# Patient Record
Sex: FEMALE | Race: BLACK OR AFRICAN AMERICAN
[De-identification: names, ages, dates, MRNs, and addresses within clinical notes are randomized per-mention and may not be internally consistent; named-entity substitution may affect disease eponyms.]

---

## 2018-10-07 ENCOUNTER — HOSPITAL ENCOUNTER (EMERGENCY)
Dept: HOSPITAL 61 - ER | Age: 68
Discharge: HOME | End: 2018-10-07
Payer: COMMERCIAL

## 2018-10-07 VITALS — WEIGHT: 237 LBS | BODY MASS INDEX: 38.09 KG/M2 | HEIGHT: 66 IN

## 2018-10-07 VITALS — DIASTOLIC BLOOD PRESSURE: 70 MMHG | SYSTOLIC BLOOD PRESSURE: 148 MMHG

## 2018-10-07 DIAGNOSIS — E78.00: ICD-10-CM

## 2018-10-07 DIAGNOSIS — I10: ICD-10-CM

## 2018-10-07 DIAGNOSIS — E03.9: ICD-10-CM

## 2018-10-07 DIAGNOSIS — Z88.5: ICD-10-CM

## 2018-10-07 DIAGNOSIS — K21.9: ICD-10-CM

## 2018-10-07 DIAGNOSIS — R10.13: Primary | ICD-10-CM

## 2018-10-07 DIAGNOSIS — R10.12: ICD-10-CM

## 2018-10-07 LAB
ALBUMIN SERPL-MCNC: 4 G/DL (ref 3.4–5)
ALBUMIN/GLOB SERPL: 0.8 {RATIO} (ref 1–1.7)
ALP SERPL-CCNC: 59 U/L (ref 46–116)
ALT SERPL-CCNC: 31 U/L (ref 14–59)
AMPHETAMINE/METHAMPHETAMINE: (no result)
ANION GAP SERPL CALC-SCNC: 9 MMOL/L (ref 6–14)
APTT PPP: YELLOW S
AST SERPL-CCNC: 31 U/L (ref 15–37)
BACTERIA #/AREA URNS HPF: 0 /HPF
BARBITURATES UR-MCNC: (no result) UG/ML
BASOPHILS # BLD AUTO: 0.1 X10^3/UL (ref 0–0.2)
BASOPHILS NFR BLD: 1 % (ref 0–3)
BENZODIAZ UR-MCNC: (no result) UG/L
BILIRUB SERPL-MCNC: 0.5 MG/DL (ref 0.2–1)
BILIRUB UR QL STRIP: NEGATIVE
BUN SERPL-MCNC: 8 MG/DL (ref 7–20)
BUN/CREAT SERPL: 10 (ref 6–20)
CALCIUM SERPL-MCNC: 10.3 MG/DL (ref 8.5–10.1)
CANNABINOIDS UR-MCNC: (no result) UG/L
CHLORIDE SERPL-SCNC: 97 MMOL/L (ref 98–107)
CO2 SERPL-SCNC: 30 MMOL/L (ref 21–32)
COCAINE UR-MCNC: (no result) NG/ML
CREAT SERPL-MCNC: 0.8 MG/DL (ref 0.6–1)
EOSINOPHIL NFR BLD: 0.1 X10^3/UL (ref 0–0.7)
EOSINOPHIL NFR BLD: 1 % (ref 0–3)
ERYTHROCYTE [DISTWIDTH] IN BLOOD BY AUTOMATED COUNT: 15.6 % (ref 11.5–14.5)
FIBRINOGEN PPP-MCNC: CLEAR MG/DL
GFR SERPLBLD BASED ON 1.73 SQ M-ARVRAT: 86.8 ML/MIN
GLOBULIN SER-MCNC: 5 G/DL (ref 2.2–3.8)
GLUCOSE SERPL-MCNC: 108 MG/DL (ref 70–99)
HCT VFR BLD CALC: 39.1 % (ref 36–47)
HGB BLD-MCNC: 13.5 G/DL (ref 12–15.5)
LIPASE: 111 U/L (ref 73–393)
LYMPHOCYTES # BLD: 1.8 X10^3/UL (ref 1–4.8)
LYMPHOCYTES NFR BLD AUTO: 21 % (ref 24–48)
MCH RBC QN AUTO: 31 PG (ref 25–35)
MCHC RBC AUTO-ENTMCNC: 35 G/DL (ref 31–37)
MCV RBC AUTO: 88 FL (ref 79–100)
METHADONE SERPL-MCNC: (no result) NG/ML
MONO #: 0.5 X10^3/UL (ref 0–1.1)
MONOCYTES NFR BLD: 6 % (ref 0–9)
NEUT #: 5.8 X10^3UL (ref 1.8–7.7)
NEUTROPHILS NFR BLD AUTO: 70 % (ref 31–73)
NITRITE UR QL STRIP: NEGATIVE
OPIATES UR-MCNC: (no result) NG/ML
PCP SERPL-MCNC: (no result) MG/DL
PH UR STRIP: 7.5 [PH]
PLATELET # BLD AUTO: 366 X10^3/UL (ref 140–400)
POTASSIUM SERPL-SCNC: 4.1 MMOL/L (ref 3.5–5.1)
PROT SERPL-MCNC: 9 G/DL (ref 6.4–8.2)
PROT UR STRIP-MCNC: NEGATIVE MG/DL
RBC # BLD AUTO: 4.42 X10^6/UL (ref 3.5–5.4)
RBC #/AREA URNS HPF: 0 /HPF (ref 0–2)
SODIUM SERPL-SCNC: 136 MMOL/L (ref 136–145)
SQUAMOUS #/AREA URNS LPF: (no result) /LPF
UROBILINOGEN UR-MCNC: 0.2 MG/DL
WBC # BLD AUTO: 8.2 X10^3/UL (ref 4–11)
WBC #/AREA URNS HPF: (no result) /HPF (ref 0–4)

## 2018-10-07 PROCEDURE — 36415 COLL VENOUS BLD VENIPUNCTURE: CPT

## 2018-10-07 PROCEDURE — 80307 DRUG TEST PRSMV CHEM ANLYZR: CPT

## 2018-10-07 PROCEDURE — G0479 DRUG TEST PRESUMP NOT OPT: HCPCS

## 2018-10-07 PROCEDURE — 74177 CT ABD & PELVIS W/CONTRAST: CPT

## 2018-10-07 PROCEDURE — 83690 ASSAY OF LIPASE: CPT

## 2018-10-07 PROCEDURE — 83880 ASSAY OF NATRIURETIC PEPTIDE: CPT

## 2018-10-07 PROCEDURE — 85025 COMPLETE CBC W/AUTO DIFF WBC: CPT

## 2018-10-07 PROCEDURE — C9113 INJ PANTOPRAZOLE SODIUM, VIA: HCPCS

## 2018-10-07 PROCEDURE — 81001 URINALYSIS AUTO W/SCOPE: CPT

## 2018-10-07 PROCEDURE — 80053 COMPREHEN METABOLIC PANEL: CPT

## 2018-10-07 PROCEDURE — 84484 ASSAY OF TROPONIN QUANT: CPT

## 2018-10-07 PROCEDURE — 93005 ELECTROCARDIOGRAM TRACING: CPT

## 2018-10-07 PROCEDURE — 99285 EMERGENCY DEPT VISIT HI MDM: CPT

## 2018-10-07 PROCEDURE — 96374 THER/PROPH/DIAG INJ IV PUSH: CPT

## 2018-10-07 NOTE — PHYS DOC
Past Medical History


Past Medical History:  GERD, High Cholesterol, Hypertension, Hypothyroid, 

Pneumonia, Other


Additional Past Medical Histor:  HEART MURMER


Past Surgical History:  Other


Additional Past Surgical Histo:  LEFT FOOT


Alcohol Use:  None


Drug Use:  None





Adult General


Chief Complaint


Chief Complaint:  ABDOMINAL PAIN





[facility]


HPI





Patient is a 68  year old female with history of acid reflex, hypertension, 

high cholesterol, who presents today with a mild intermittent epigastric 

abdominal pain radiating to the left upper quadrant that began 6 days ago. 

Patient denies anything exacerbating or making the pain better. She states she 

was seen by the PCP 2 days ago and was started on Prilosec. She states she has 

not attained much relief from it. Patient denies any nausea vomiting. Denies 

any chest pain or shortness of breath.





Review of Systems


Review of Systems





Constitutional: Denies fever or chills []


Eyes: Denies change in visual acuity, redness, or eye pain []


HENT: Denies nasal congestion or sore throat []


Respiratory: Denies cough or shortness of breath []


Cardiovascular: No additional information not addressed in HPI []


GI: Reports left upper quadrant abdominal pain, epigastric pain, denies nausea, 

vomiting, bloody stools or diarrhea []


: Denies dysuria or hematuria []


Musculoskeletal: Denies back pain or joint pain []


Integument: Denies rash or skin lesions []


Neurologic: Denies headache, focal weakness or sensory changes []





All other systems were reviewed and found to be within normal limits, except as 

documented in this note.





Current Medications


Current Medications





Current Medications








 Medications


  (Trade)  Dose


 Ordered  Sig/Tanvi  Start Time


 Stop Time Status Last Admin


Dose Admin


 


 Info


  (CONTRAST GIVEN


 -- Rx MONITORING)  1 each  PRN DAILY  PRN  10/7/18 10:15


 10/9/18 10:14   





 


 Iohexol


  (Omnipaque 300


 Mg/ml)  75 ml  1X  ONCE  10/7/18 10:15


 10/7/18 10:16 DC 10/7/18 10:15


75 ML


 


 Multi-Ingredient


 Mouthwash/Gargle


  (Gi Cocktail)  20 ml  1X  ONCE  10/7/18 09:45


 10/7/18 09:46 DC 10/7/18 10:10


20 ML


 


 Pantoprazole


 Sodium


  (PROTONIX VIAL


 for IV PUSH)  40 mg  1X  ONCE  10/7/18 09:45


 10/7/18 09:46 DC 10/7/18 10:11


40 MG


 


 Sodium Chloride  1,000 ml @ 


 1,000 mls/hr  1X  ONCE  10/7/18 09:45


 10/7/18 10:44 DC 10/7/18 10:04


1,000 MLS/HR











Allergies


Allergies





Allergies








Coded Allergies Type Severity Reaction Last Updated Verified


 


  codeine Allergy Intermediate  8/1/14 Yes











Physical Exam


Physical Exam





Constitutional: Well developed, well nourished, no acute distress, non-toxic 

appearance. []


HENT: Normocephalic, atraumatic, bilateral external ears normal, oropharynx 

moist, no oral exudates, nose normal. []


Eyes: PERRLA, EOMI, conjunctiva normal, no discharge. [] 


Neck: Normal range of motion, no tenderness, supple, no stridor. [] 


Cardiovascular:Heart rate regular rhythm, no murmur []


Lungs & Thorax:  Bilateral breath sounds clear to auscultation []


Abdomen: Bowel sounds normal, soft, no tenderness, no masses, no pulsatile 

masses. [] 


Skin: Warm, dry, no erythema, no rash. [] 


Back: No tenderness, no CVA tenderness. [] 


Extremities: No tenderness, no cyanosis, no clubbing, ROM intact, no edema. [] 


Neurologic: Alert and oriented X 3, normal motor function, normal sensory 

function, no focal deficits noted. []


Psychologic: Affect normal, judgement normal, mood normal. []





Current Patient Data


Vital Signs





 Vital Signs








  Date Time  Temp Pulse Resp B/P (MAP) Pulse Ox O2 Delivery O2 Flow Rate FiO2


 


10/7/18 09:15 98.1 92 22 151/75 (100) 97 Room Air  





 98.1       








Lab Values





 Laboratory Tests








Test


 10/7/18


09:24 10/7/18


09:54


 


Urine Collection Type Unknown   


 


Urine Color Yellow   


 


Urine Clarity Clear   


 


Urine pH 7.5   


 


Urine Specific Gravity 1.010   


 


Urine Protein


 Negative mg/dL


(NEG-TRACE) 





 


Urine Glucose (UA)


 Negative mg/dL


(NEG) 





 


Urine Ketones (Stick)


 Negative mg/dL


(NEG) 





 


Urine Blood


 Negative (NEG)


 





 


Urine Nitrite


 Negative (NEG)


 





 


Urine Bilirubin


 Negative (NEG)


 





 


Urine Urobilinogen Dipstick


 0.2 mg/dL (0.2


mg/dL) 





 


Urine Leukocyte Esterase


 Negative (NEG)


 





 


Urine RBC 0 /HPF (0-2)   


 


Urine WBC


 1-4 /HPF (0-4)


 





 


Urine Squamous Epithelial


Cells Few /LPF  


 





 


Urine Bacteria


 0 /HPF (0-FEW)


 





 


Urine Opiates Screen Neg (NEG)   


 


Urine Methadone Screen Neg (NEG)   


 


Urine Barbiturates Neg (NEG)   


 


Urine Phencyclidine Screen Neg (NEG)   


 


Urine


Amphetamine/Methamphetamine Neg (NEG)  


 





 


Urine Benzodiazepines Screen Neg (NEG)   


 


Urine Cocaine Screen Neg (NEG)   


 


Urine Cannabinoids Screen Neg (NEG)   


 


Urine Ethyl Alcohol Neg (NEG)   


 


White Blood Count


 


 8.2 x10^3/uL


(4.0-11.0)


 


Red Blood Count


 


 4.42 x10^6/uL


(3.50-5.40)


 


Hemoglobin


 


 13.5 g/dL


(12.0-15.5)


 


Hematocrit


 


 39.1 %


(36.0-47.0)


 


Mean Corpuscular Volume


 


 88 fL ()





 


Mean Corpuscular Hemoglobin  31 pg (25-35)  


 


Mean Corpuscular Hemoglobin


Concent 


 35 g/dL


(31-37)


 


Red Cell Distribution Width


 


 15.6 %


(11.5-14.5)  H


 


Platelet Count


 


 366 x10^3/uL


(140-400)


 


Neutrophils (%) (Auto)  70 % (31-73)  


 


Lymphocytes (%) (Auto)  21 % (24-48)  L


 


Monocytes (%) (Auto)  6 % (0-9)  


 


Eosinophils (%) (Auto)  1 % (0-3)  


 


Basophils (%) (Auto)  1 % (0-3)  


 


Neutrophils # (Auto)


 


 5.8 x10^3uL


(1.8-7.7)


 


Lymphocytes # (Auto)


 


 1.8 x10^3/uL


(1.0-4.8)


 


Monocytes # (Auto)


 


 0.5 x10^3/uL


(0.0-1.1)


 


Eosinophils # (Auto)


 


 0.1 x10^3/uL


(0.0-0.7)


 


Basophils # (Auto)


 


 0.1 x10^3/uL


(0.0-0.2)


 


Sodium Level


 


 136 mmol/L


(136-145)


 


Potassium Level


 


 4.1 mmol/L


(3.5-5.1)


 


Chloride Level


 


 97 mmol/L


()  L


 


Carbon Dioxide Level


 


 30 mmol/L


(21-32)


 


Anion Gap  9 (6-14)  


 


Blood Urea Nitrogen


 


 8 mg/dL (7-20)





 


Creatinine


 


 0.8 mg/dL


(0.6-1.0)


 


Estimated GFR


(Cockcroft-Gault) 


 86.8  





 


BUN/Creatinine Ratio  10 (6-20)  


 


Glucose Level


 


 108 mg/dL


(70-99)  H


 


Calcium Level


 


 10.3 mg/dL


(8.5-10.1)  H


 


Total Bilirubin


 


 0.5 mg/dL


(0.2-1.0)


 


Aspartate Amino Transferase


(AST) 


 31 U/L (15-37)





 


Alanine Aminotransferase (ALT)


 


 31 U/L (14-59)





 


Alkaline Phosphatase


 


 59 U/L


()


 


Troponin I Quantitative


 


 < 0.017 ng/mL


(0.000-0.055)


 


NT-Pro-B-Type Natriuretic


Peptide 


 25 pg/mL


(0-124)


 


Total Protein


 


 9.0 g/dL


(6.4-8.2)  H


 


Albumin


 


 4.0 g/dL


(3.4-5.0)


 


Albumin/Globulin Ratio


 


 0.8 (1.0-1.7)


L


 


Lipase


 


 111 U/L


()


 


Ethyl Alcohol Level


 


 < 10 mg/dL


(0-10)





 Laboratory Tests


10/7/18 09:54








 Laboratory Tests


10/7/18 09:54











EKG


EKG


09:40 Interpreted by Dr. Fonseca sinus rhythm Hr 69 no STEMI[]





Radiology/Procedures


Radiology/Procedures


[]PROCEDURE: CT ABD PELV W/ IV CONTRST ONLY





EXAM: CT ABDOMEN/PELVIS WITH CONTRAST.


 


HISTORY: Epigastric and left upper quadrant pain.


 


TECHNIQUE: Computed tomography of the abdomen and pelvis was performed 


after the intravenous administration of 75 mL Omnipaque 300.


 


COMPARISON: 4-16.


 


FINDINGS: Lung windows through the visualized portions of the bases reveal


mild atelectasis. Bone windows reveal no suspicious lesions.


 


A cyst in the left kidney measures 3.7 cm and appears benign. The right 


kidney is unremarkable. The liver, gallbladder, pancreas, adrenal glands 


and spleen are unremarkable. There is a small hiatal hernia. There are no 


pathologically enlarged lymph nodes.


 


The appendix is not inflamed. There is no small bowel obstruction. The 


uterus and ovaries are unremarkable by CT. A small umbilical hernia 


contains only fat.


 


IMPRESSION: 


1. No cause for acute pain is identified.


2. Small umbilical hernia containing only fat.


3. Small hiatal hernia.


 


 


*One or more of the following individualized dose reduction techniques 


were utilized for this examination:  


1. Automated exposure control.  


2. Adjustment of the mA and/or kV according to patient size.  


3. Use of iterative reconstruction technique.


 


Electronically signed by: NILAY aDwkins MD (10/7/2018 11:10 AM) 


[facility]














DICTATED and SIGNED BY:     DARIEL DAWKINS MD


DATE:     10/07/18 1051





Course & Med Decision Making


Course & Med Decision Making


Pertinent Labs and Imaging studies reviewed. (See chart for details)





This is a 68-year-old female patient presenting to the ED today with mild 

epigastric abdominal pain radiating to the left upper quadrant. Patient's labs 

are negative for any acute findings, CT of the abdomen and pelvic is also 

negative. Switched her to Nexium and Carafate. Follow-up with GI specialist 

which I provided. Discharged in stable condition.





Dragon Disclaimer


Dragon Disclaimer


This electronic medical record was generated, in whole or in part, using a 

voice recognition dictation system.





Departure


Departure


Impression:  


 Primary Impression:  


 Epigastric pain


 Additional Impression:  


 Left upper quadrant pain


Disposition:  01 HOME, SELF-CARE


Condition:  STABLE


Referrals:  


RAVINDER CELAYA MD (PCP)








MARBIN CONNOLLY MD


Follow up in one week


Patient Instructions:  Abdominal Pain





Additional Instructions:  


You were evaluated in the emergency room for abdominal pain. We wrote a couple 

prescription medicines. Take them as prescribed. Follow-up with the GI 

specialist provided in one week. Come back to the ED at any point symptoms 

worsen.


Scripts


Sucralfate (CARAFATE) 1 Gm Tablet


1 TAB PO QID, #120 TAB 1 Refill


   Prov: ZIA DENNEY         10/7/18 


Esomeprazole Magnesium (NEXIUM CAPSULE) 40 Mg Capsule.


1 CAP PO DAILY, #30 CAP 0 Refills


   Prov: ZIA DENNEY         10/7/18





Problem Qualifiers











ZIA DENNEY Oct 7, 2018 09:40

## 2018-10-07 NOTE — EKG
[facility]

              [address] [place] [address]

Test Date:    2018-10-07               Test Time:    09:40:07

Pat Name:     RADHA CONTEH             Department:   

Patient ID:   [id number]           Room:          

Gender:       F                        Technician:   

:          1950               Requested By: ZIA DENNEY

Order Number: [id number]           Reading MD:   Jae Simpson MD

                                 Measurements

Intervals                              Axis          

Rate:         69                       P:            148

CA:           154                      QRS:          -156

QRSD:         82                       T:            -175

QT:           376                                    

QTc:          404                                    

                           Interpretive Statements

SINUS RHYTHM

LIMB LEAD MISPLACEMENT

Electronically Signed On 10- 12:13:16 CDT by Jae Simpson MD

## 2018-10-07 NOTE — RAD
EXAM: CT ABDOMEN/PELVIS WITH CONTRAST.

 

HISTORY: Epigastric and left upper quadrant pain.

 

TECHNIQUE: Computed tomography of the abdomen and pelvis was performed 

after the intravenous administration of 75 mL Omnipaque 300.

 

COMPARISON: 4-16.

 

FINDINGS: Lung windows through the visualized portions of the bases reveal

mild atelectasis. Bone windows reveal no suspicious lesions.

 

A cyst in the left kidney measures 3.7 cm and appears benign. The right 

kidney is unremarkable. The liver, gallbladder, pancreas, adrenal glands 

and spleen are unremarkable. There is a small hiatal hernia. There are no 

pathologically enlarged lymph nodes.

 

The appendix is not inflamed. There is no small bowel obstruction. The 

uterus and ovaries are unremarkable by CT. A small umbilical hernia 

contains only fat.

 

IMPRESSION: 

1. No cause for acute pain is identified.

2. Small umbilical hernia containing only fat.

3. Small hiatal hernia.

 

 

*One or more of the following individualized dose reduction techniques 

were utilized for this examination:  

1. Automated exposure control.  

2. Adjustment of the mA and/or kV according to patient size.  

3. Use of iterative reconstruction technique.

 

Electronically signed by: NILAY Dawkins MD (10/7/2018 11:10 AM) 

[facility]

## 2018-10-24 ENCOUNTER — HOSPITAL ENCOUNTER (OUTPATIENT)
Dept: HOSPITAL 61 - ENDOS | Age: 68
Discharge: HOME | End: 2018-10-24
Attending: INTERNAL MEDICINE
Payer: COMMERCIAL

## 2018-10-24 VITALS — SYSTOLIC BLOOD PRESSURE: 131 MMHG | DIASTOLIC BLOOD PRESSURE: 61 MMHG

## 2018-10-24 DIAGNOSIS — E03.9: ICD-10-CM

## 2018-10-24 DIAGNOSIS — Z72.89: ICD-10-CM

## 2018-10-24 DIAGNOSIS — F41.9: ICD-10-CM

## 2018-10-24 DIAGNOSIS — Z79.82: ICD-10-CM

## 2018-10-24 DIAGNOSIS — Z88.5: ICD-10-CM

## 2018-10-24 DIAGNOSIS — Z98.51: ICD-10-CM

## 2018-10-24 DIAGNOSIS — E78.00: ICD-10-CM

## 2018-10-24 DIAGNOSIS — I10: ICD-10-CM

## 2018-10-24 DIAGNOSIS — K29.50: Primary | ICD-10-CM

## 2018-10-24 DIAGNOSIS — Z98.890: ICD-10-CM

## 2018-10-24 DIAGNOSIS — Z79.899: ICD-10-CM

## 2018-10-24 DIAGNOSIS — M19.90: ICD-10-CM

## 2018-10-24 DIAGNOSIS — K21.9: ICD-10-CM

## 2018-10-24 DIAGNOSIS — Z82.49: ICD-10-CM

## 2018-10-24 PROCEDURE — 43235 EGD DIAGNOSTIC BRUSH WASH: CPT

## 2019-11-08 ENCOUNTER — HOSPITAL ENCOUNTER (OUTPATIENT)
Dept: HOSPITAL 61 - SURG | Age: 69
End: 2019-11-08
Attending: INTERNAL MEDICINE
Payer: COMMERCIAL

## 2019-11-08 VITALS
SYSTOLIC BLOOD PRESSURE: 144 MMHG | DIASTOLIC BLOOD PRESSURE: 68 MMHG | SYSTOLIC BLOOD PRESSURE: 144 MMHG | DIASTOLIC BLOOD PRESSURE: 68 MMHG

## 2019-11-08 DIAGNOSIS — Z86.010: ICD-10-CM

## 2019-11-08 DIAGNOSIS — D12.3: ICD-10-CM

## 2019-11-08 DIAGNOSIS — E78.5: ICD-10-CM

## 2019-11-08 DIAGNOSIS — Z98.51: ICD-10-CM

## 2019-11-08 DIAGNOSIS — Z98.890: ICD-10-CM

## 2019-11-08 DIAGNOSIS — E66.9: ICD-10-CM

## 2019-11-08 DIAGNOSIS — I10: ICD-10-CM

## 2019-11-08 DIAGNOSIS — Z12.11: Primary | ICD-10-CM

## 2019-11-08 DIAGNOSIS — Z80.0: ICD-10-CM

## 2019-11-08 DIAGNOSIS — E78.00: ICD-10-CM

## 2019-11-08 DIAGNOSIS — E03.9: ICD-10-CM

## 2019-11-08 DIAGNOSIS — K57.30: ICD-10-CM

## 2019-11-08 DIAGNOSIS — Z88.5: ICD-10-CM

## 2019-11-08 DIAGNOSIS — Z87.39: ICD-10-CM

## 2019-11-08 DIAGNOSIS — K64.0: ICD-10-CM

## 2019-11-08 DIAGNOSIS — K21.9: ICD-10-CM

## 2019-11-08 PROCEDURE — 45380 COLONOSCOPY AND BIOPSY: CPT

## 2019-11-08 PROCEDURE — 88305 TISSUE EXAM BY PATHOLOGIST: CPT

## 2019-11-08 PROCEDURE — 45385 COLONOSCOPY W/LESION REMOVAL: CPT

## 2019-11-08 NOTE — HP
ADMIT DATE:  2019



REASON FOR CONSULTATION:  History of colonic polyps and surveillance.



HISTORY OF PRESENT ILLNESS:  A 69-year-old -American female with past

medical history significant for hypertension, gastroesophageal reflux disease,

hypothyroidism, hyperlipidemia, seen for interval colonoscopy.  Last exam was

approximately 5 years ago, at which time polyps were encountered.  There has

been no change in bowel habits, diarrhea or constipation.  Family history is

positive for colon cancer with her brother who is .  Weight and appetite

are stable.  She is otherwise without additional complaints.



PAST MEDICAL HISTORY:  Hypertension, hyperlipidemia, hypothyroidism,

osteoarthrosis.



ALLERGIES:  CODEINE.



MEDICATIONS:  Include amlodipine, aspirin, ____, Nexium, Allegra, Flonase,

Synthroid, omega-3 fatty acids, Crestor, Carafate,

triamterene/hydrochlorothiazide.



PAST SURGICAL HISTORY:  AAA repair, lung surgery, lysis of adhesions, tubal

ligation, knee repair.



REVIEW OF SYSTEMS:  Per records.



PHYSICAL EXAMINATION:

GENERAL:  Reveals a well-nourished, well-developed -American female who

is alert, cooperative, in no acute distress.

VITAL SIGNS:  Temperature is 97.5, pulse 85, respiratory rate is 20.

HEENT:  Normocephalic, atraumatic head.  Pupils and extraocular muscles are not

tested.  Sclerae anicteric.

NECK:  Supple.

LUNGS:  Clear.

CARDIOVASCULAR:  Reveals an S1, S2 without S3, S4 or appreciable murmur.

ABDOMEN:  Reveals soft abdomen, normal bowel sounds, without appreciable

hepatosplenomegaly with multiple surgical incisions.

EXTREMITIES:  No cyanosis, clubbing or edema.



IMPRESSION:  History of colonic polyps, family history of colon cancer, it is

warranted at this time, risks and benefits of procedure including risk of

hemorrhage and perforation during the operation have been discussed.  The

patient is willing to proceed.

 



______________________________

MARBIN CONNOLLY MD



DR:  SSP/zee  JOB#:  [id number] / [id number]

DD:  2019 10:04  DT:  2019 10:36

## 2019-11-11 NOTE — PATHOLOGY
[facility] Accession Number: [id number]

.                                                                01

Material submitted:                                        .

colon - TRANSVERSE COLON POLYP. Modifiers: transverse

.                                                                01

Clinical history:                                          .

Polyps

.                                                                02

**********************************************************************

Diagnosis:

Colon biopsy, transverse colon polyp:

- Tubular adenoma.

.

(JP:mml; 11/11/2019)

[facility]  11/11/2019  0938 Local

**********************************************************************

.                                                                02

Comment:

There is no high grade dysplasia or evidence of malignancy.

.

([facility]:mml; 11/11/2019)

.                                                                02

Electronically signed:                                     .

Oracio Mchugh MD, Pathologist

NPI- [id number]

.                                                                01

Gross description:                                         .

Received in formalin labeled "Cynthia Hooks, transverse colon polyp," is a

0.5 x 0.4 x 0.4 cm polypoid piece of tan soft tissue.  The margin is inked

and the tissue is sectioned perpendicular to the margin and submitted

entirely in cassette A1.

(TSD; 11/8/2019)

TOB/TOB  11/08/2019 2054 Local

.                                                                02

Pathologist provided ICD-10:

D12.3

.                                                                02

CPT                                                        .

213687

Specimen Comment: A courtesy copy of this report has been sent to [phone], [phone]

Specimen Comment: [id number]

Specimen Comment: Report sent to  / DR PUENTES

***Performed at:  01

   [facility]

   [address], [place]  [id number]

   MD Enrico Keller MD Phone:  [phone]

***Performed at:  02

   [facility]

   [address], [place]  [id number]

   MD Oracio Mchugh MD Phone:  [phone]

## 2021-05-27 ENCOUNTER — HOSPITAL ENCOUNTER (OUTPATIENT)
Dept: HOSPITAL 63 - LAB | Age: 71
End: 2021-05-27
Payer: COMMERCIAL

## 2021-05-27 DIAGNOSIS — D75.89: Primary | ICD-10-CM

## 2021-05-27 LAB
BASOPHILS # BLD AUTO: 0 X10^3/UL (ref 0–0.2)
BASOPHILS NFR BLD: 0 % (ref 0–3)
EOSINOPHIL NFR BLD: 0.2 X10^3/UL (ref 0–0.7)
EOSINOPHIL NFR BLD: 2 % (ref 0–3)
ERYTHROCYTE [DISTWIDTH] IN BLOOD BY AUTOMATED COUNT: 15 % (ref 11.5–14.5)
HCT VFR BLD CALC: 37.6 % (ref 36–47)
HGB BLD-MCNC: 12.4 G/DL (ref 12–15.5)
LYMPHOCYTES # BLD: 2.3 X10^3/UL (ref 1–4.8)
LYMPHOCYTES NFR BLD AUTO: 23 % (ref 24–48)
MCH RBC QN AUTO: 29 PG (ref 25–35)
MCHC RBC AUTO-ENTMCNC: 33 G/DL (ref 31–37)
MCV RBC AUTO: 87 FL (ref 79–100)
MONO #: 0.4 X10^3/UL (ref 0–1.1)
MONOCYTES NFR BLD: 4 % (ref 0–9)
NEUT #: 7 X10^3UL (ref 1.8–7.7)
NEUTROPHILS NFR BLD AUTO: 70 % (ref 31–73)
PLATELET # BLD AUTO: 424 X10^3/UL (ref 140–400)
PLATELET # BLD EST: (no result) 10*3/UL
RBC # BLD AUTO: 4.31 X10^6/UL (ref 3.5–5.4)
WBC # BLD AUTO: 10 X10^3/UL (ref 4–11)

## 2021-05-27 PROCEDURE — 83540 ASSAY OF IRON: CPT

## 2021-05-27 PROCEDURE — 36415 COLL VENOUS BLD VENIPUNCTURE: CPT

## 2021-05-27 PROCEDURE — 85025 COMPLETE CBC W/AUTO DIFF WBC: CPT

## 2021-05-27 PROCEDURE — 82728 ASSAY OF FERRITIN: CPT

## 2021-05-27 PROCEDURE — 81270 JAK2 GENE: CPT

## 2021-05-27 PROCEDURE — 86140 C-REACTIVE PROTEIN: CPT

## 2021-05-27 PROCEDURE — 83550 IRON BINDING TEST: CPT

## 2021-09-16 ENCOUNTER — HOSPITAL ENCOUNTER (OUTPATIENT)
Dept: HOSPITAL 61 - ONCLAB | Age: 71
End: 2021-09-16
Attending: INTERNAL MEDICINE
Payer: MEDICARE

## 2021-09-16 DIAGNOSIS — D50.0: ICD-10-CM

## 2021-09-16 DIAGNOSIS — D75.89: Primary | ICD-10-CM

## 2021-09-16 LAB
BASOPHILS # BLD AUTO: 0 X10^3/UL (ref 0–0.2)
BASOPHILS NFR BLD: 1 % (ref 0–3)
EOSINOPHIL NFR BLD: 0.1 X10^3/UL (ref 0–0.7)
EOSINOPHIL NFR BLD: 1 % (ref 0–3)
ERYTHROCYTE [DISTWIDTH] IN BLOOD BY AUTOMATED COUNT: 15.3 % (ref 11.5–14.5)
HCT VFR BLD CALC: 38 % (ref 36–47)
HGB BLD-MCNC: 12.7 G/DL (ref 12–15.5)
LYMPHOCYTES # BLD: 1.9 X10^3/UL (ref 1–4.8)
LYMPHOCYTES NFR BLD AUTO: 23 % (ref 24–48)
MCH RBC QN AUTO: 29 PG (ref 25–35)
MCHC RBC AUTO-ENTMCNC: 34 G/DL (ref 31–37)
MCV RBC AUTO: 86 FL (ref 79–100)
MONO #: 0.5 X10^3/UL (ref 0–1.1)
MONOCYTES NFR BLD: 6 % (ref 0–9)
NEUT #: 5.9 X10^3/UL (ref 1.8–7.7)
NEUTROPHILS NFR BLD AUTO: 70 % (ref 31–73)
PLATELET # BLD AUTO: 395 X10^3/UL (ref 140–400)
RBC # BLD AUTO: 4.44 X10^6/UL (ref 3.5–5.4)
WBC # BLD AUTO: 8.4 X10^3/UL (ref 4–11)

## 2021-09-16 PROCEDURE — 85025 COMPLETE CBC W/AUTO DIFF WBC: CPT

## 2021-09-16 PROCEDURE — 82728 ASSAY OF FERRITIN: CPT

## 2021-09-16 PROCEDURE — 83540 ASSAY OF IRON: CPT

## 2021-09-16 PROCEDURE — 36415 COLL VENOUS BLD VENIPUNCTURE: CPT

## 2021-09-16 PROCEDURE — 83550 IRON BINDING TEST: CPT

## 2022-03-01 ENCOUNTER — HOSPITAL ENCOUNTER (OUTPATIENT)
Dept: HOSPITAL 63 - DXRAD | Age: 72
End: 2022-03-01
Attending: NURSE PRACTITIONER
Payer: COMMERCIAL

## 2022-03-01 DIAGNOSIS — M85.89: Primary | ICD-10-CM

## 2022-03-01 PROCEDURE — 77080 DXA BONE DENSITY AXIAL: CPT

## 2022-03-01 NOTE — RAD
DXA BONE DENSITY AXIAL



History: Osteopenia.



Comparison: None.



TECHNIQUE: Dual energy x-ray absorptiometry of the lumbar spine and right hip was performed. T-score 
of average bone mineral density based was calculated based on standard deviations above or below the 
expected young adult normal value. Diagnostic definitions were established by the [employer]
zation.



FINDINGS: The average bone mineral density associated with L1-L4 is 1.097 g/cm^2, corresponding with 
a T-score of -0.7.



The average total bone mineral density associated with right hip is 0.769 g/cm^2, corresponding with 
a T-score of -1.5.



Refer to the worksheets for full detail.



IMPRESSION:   

1. Osteopenia. Average bone mineral density yields a T-score between -1.0 and -2.5. Fracture risk is 
increased.



Electronically signed by: Rafael Robb MD (3/1/2022 1:24 PM) [id number]

## 2023-07-19 ENCOUNTER — APPOINTMENT (RX ONLY)
Dept: URBAN - METROPOLITAN AREA CLINIC 76 | Facility: CLINIC | Age: 73
Setting detail: DERMATOLOGY
End: 2023-07-19

## 2023-07-19 DIAGNOSIS — L66.81 CENTRAL CENTRIFUGAL CICATRICIAL ALOPECIA: ICD-10-CM

## 2023-07-19 DIAGNOSIS — L70.0 ACNE VULGARIS: ICD-10-CM

## 2023-07-19 DIAGNOSIS — L66.8 OTHER CICATRICIAL ALOPECIA: ICD-10-CM

## 2023-07-19 DIAGNOSIS — L81.0 POSTINFLAMMATORY HYPERPIGMENTATION: ICD-10-CM

## 2023-07-19 PROCEDURE — ? ADDITIONAL NOTES

## 2023-07-19 PROCEDURE — ? TREATMENT REGIMEN

## 2023-07-19 PROCEDURE — ? COUNSELING

## 2023-07-19 PROCEDURE — ? PRESCRIPTION

## 2023-07-19 PROCEDURE — 99204 OFFICE O/P NEW MOD 45 MIN: CPT

## 2023-07-19 RX ORDER — DOXYCYCLINE HYCLATE 50 MG/1
CAPSULE, GELATIN COATED ORAL
Qty: 60 | Refills: 0 | Status: ERX | COMMUNITY
Start: 2023-07-19

## 2023-07-19 RX ORDER — FINASTERIDE 1 MG/1
TABLET, FILM COATED ORAL
Qty: 60 | Refills: 0 | Status: ERX | COMMUNITY
Start: 2023-07-19

## 2023-07-19 RX ORDER — MINOXIDIL 5 G/100ML
SOLUTION TOPICAL
Qty: 60 | Refills: 0 | Status: ERX | COMMUNITY
Start: 2023-07-19

## 2023-07-19 RX ORDER — FLUOCINOLONE ACETONIDE 0.11 MG/ML
OIL TOPICAL
Qty: 118.28 | Refills: 0 | Status: ERX | COMMUNITY
Start: 2023-07-19

## 2023-07-19 RX ADMIN — FLUOCINOLONE ACETONIDE: 0.11 OIL TOPICAL at 00:00

## 2023-07-19 RX ADMIN — FINASTERIDE: 1 TABLET, FILM COATED ORAL at 00:00

## 2023-07-19 RX ADMIN — MINOXIDIL: 5 SOLUTION TOPICAL at 00:00

## 2023-07-19 RX ADMIN — DOXYCYCLINE HYCLATE: 50 CAPSULE, GELATIN COATED ORAL at 00:00

## 2023-07-19 NOTE — HPI: ANDROGENIC ALOPECIA
Is This A New Presentation, Or A Follow-Up?: Hair Loss
How Did The Hair Loss Occur?: gradual in onset
How Severe Is Your Hair Loss?: mild
Additional History: Gets bump that never come to a head on her scalp. Hair sheds constantly. She moved over here from [place] and was seeing a dermatologist there for Alopecia. History of depression. Was prescribed finasteride. Was also given minoxidil topical solution, Dermasmoothe. Was doing injections. Had a biopsy on 11/13/2020 by punch. Scarring alopecia

## 2023-07-19 NOTE — PROCEDURE: COUNSELING
Detail Level: Detailed
Topical Sulfur Applications Counseling: Topical Sulfur Counseling: Patient counseled that this medication may cause skin irritation or allergic reactions.  In the event of skin irritation, the patient was advised to reduce the amount of the drug applied or use it less frequently.   The patient verbalized understanding of the proper use and possible adverse effects of topical sulfur application.  All of the patient's questions and concerns were addressed.
Azithromycin Pregnancy And Lactation Text: This medication is considered safe during pregnancy and is also secreted in breast milk.
Doxycycline Counseling:  Patient counseled regarding possible photosensitivity and increased risk for sunburn.  Patient instructed to avoid sunlight, if possible.  When exposed to sunlight, patients should wear protective clothing, sunglasses, and sunscreen.  The patient was instructed to call the office immediately if the following severe adverse effects occur:  hearing changes, easy bruising/bleeding, severe headache, or vision changes.  The patient verbalized understanding of the proper use and possible adverse effects of doxycycline.  All of the patient's questions and concerns were addressed.
Isotretinoin Pregnancy And Lactation Text: This medication is Pregnancy Category X and is considered extremely dangerous during pregnancy. It is unknown if it is excreted in breast milk.
Spironolactone Counseling: Patient advised regarding risks of diarrhea, abdominal pain, hyperkalemia, birth defects (for female patients), liver toxicity and renal toxicity. The patient may need blood work to monitor liver and kidney function and potassium levels while on therapy. The patient verbalized understanding of the proper use and possible adverse effects of spironolactone.  All of the patient's questions and concerns were addressed.
Azelaic Acid Pregnancy And Lactation Text: This medication is considered safe during pregnancy and breast feeding.
Dapsone Counseling: I discussed with the patient the risks of dapsone including but not limited to hemolytic anemia, agranulocytosis, rashes, methemoglobinemia, kidney failure, peripheral neuropathy, headaches, GI upset, and liver toxicity.  Patients who start dapsone require monitoring including baseline LFTs and weekly CBCs for the first month, then every month thereafter.  The patient verbalized understanding of the proper use and possible adverse effects of dapsone.  All of the patient's questions and concerns were addressed.
Tetracycline Counseling: Patient counseled regarding possible photosensitivity and increased risk for sunburn.  Patient instructed to avoid sunlight, if possible.  When exposed to sunlight, patients should wear protective clothing, sunglasses, and sunscreen.  The patient was instructed to call the office immediately if the following severe adverse effects occur:  hearing changes, easy bruising/bleeding, severe headache, or vision changes.  The patient verbalized understanding of the proper use and possible adverse effects of tetracycline.  All of the patient's questions and concerns were addressed. Patient understands to avoid pregnancy while on therapy due to potential birth defects.
High Dose Vitamin A Pregnancy And Lactation Text: High dose vitamin A therapy is contraindicated during pregnancy and breast feeding.
Benzoyl Peroxide Pregnancy And Lactation Text: This medication is Pregnancy Category C. It is unknown if benzoyl peroxide is excreted in breast milk.
Winlevi Pregnancy And Lactation Text: This medication is considered safe during pregnancy and breastfeeding.
Use Enhanced Medication Counseling?: No
Bactrim Counseling:  I discussed with the patient the risks of sulfa antibiotics including but not limited to GI upset, allergic reaction, drug rash, diarrhea, dizziness, photosensitivity, and yeast infections.  Rarely, more serious reactions can occur including but not limited to aplastic anemia, agranulocytosis, methemoglobinemia, blood dyscrasias, liver or kidney failure, lung infiltrates or desquamative/blistering drug rashes.
Topical Clindamycin Counseling: Patient counseled that this medication may cause skin irritation or allergic reactions.  In the event of skin irritation, the patient was advised to reduce the amount of the drug applied or use it less frequently.   The patient verbalized understanding of the proper use and possible adverse effects of clindamycin.  All of the patient's questions and concerns were addressed.
Erythromycin Pregnancy And Lactation Text: This medication is Pregnancy Category B and is considered safe during pregnancy. It is also excreted in breast milk.
Sarecycline Counseling: Patient advised regarding possible photosensitivity and discoloration of the teeth, skin, lips, tongue and gums.  Patient instructed to avoid sunlight, if possible.  When exposed to sunlight, patients should wear protective clothing, sunglasses, and sunscreen.  The patient was instructed to call the office immediately if the following severe adverse effects occur:  hearing changes, easy bruising/bleeding, severe headache, or vision changes.  The patient verbalized understanding of the proper use and possible adverse effects of sarecycline.  All of the patient's questions and concerns were addressed.
Aklief Pregnancy And Lactation Text: It is unknown if this medication is safe to use during pregnancy.  It is unknown if this medication is excreted in breast milk.  Breastfeeding women should use the topical cream on the smallest area of the skin for the shortest time needed while breastfeeding.  Do not apply to nipple and areola.
Tazorac Counseling:  Patient advised that medication is irritating and drying.  Patient may need to apply sparingly and wash off after an hour before eventually leaving it on overnight.  The patient verbalized understanding of the proper use and possible adverse effects of tazorac.  All of the patient's questions and concerns were addressed.
Birth Control Pills Counseling: Birth Control Pill Counseling: I discussed with the patient the potential side effects of OCPs including but not limited to increased risk of stroke, heart attack, thrombophlebitis, deep venous thrombosis, hepatic adenomas, breast changes, GI upset, headaches, and depression.  The patient verbalized understanding of the proper use and possible adverse effects of OCPs. All of the patient's questions and concerns were addressed.
Azithromycin Counseling:  I discussed with the patient the risks of azithromycin including but not limited to GI upset, allergic reaction, drug rash, diarrhea, and yeast infections.
Dapsone Pregnancy And Lactation Text: This medication is Pregnancy Category C and is not considered safe during pregnancy or breast feeding.
Tetracycline Pregnancy And Lactation Text: This medication is Pregnancy Category D and not consider safe during pregnancy. It is also excreted in breast milk.
Minocycline Counseling: Patient advised regarding possible photosensitivity and discoloration of the teeth, skin, lips, tongue and gums.  Patient instructed to avoid sunlight, if possible.  When exposed to sunlight, patients should wear protective clothing, sunglasses, and sunscreen.  The patient was instructed to call the office immediately if the following severe adverse effects occur:  hearing changes, easy bruising/bleeding, severe headache, or vision changes.  The patient verbalized understanding of the proper use and possible adverse effects of minocycline.  All of the patient's questions and concerns were addressed.
Topical Retinoid counseling:  Patient advised to apply a pea-sized amount only at bedtime and wait 30 minutes after washing their face before applying.  If too drying, patient may add a non-comedogenic moisturizer. The patient verbalized understanding of the proper use and possible adverse effects of retinoids.  All of the patient's questions and concerns were addressed.
Doxycycline Pregnancy And Lactation Text: This medication is Pregnancy Category D and not consider safe during pregnancy. It is also excreted in breast milk but is considered safe for shorter treatment courses.
Topical Clindamycin Pregnancy And Lactation Text: This medication is Pregnancy Category B and is considered safe during pregnancy. It is unknown if it is excreted in breast milk.
Spironolactone Pregnancy And Lactation Text: This medication can cause feminization of the male fetus and should be avoided during pregnancy. The active metabolite is also found in breast milk.
Benzoyl Peroxide Counseling: Patient counseled that medicine may cause skin irritation and bleach clothing.  In the event of skin irritation, the patient was advised to reduce the amount of the drug applied or use it less frequently.   The patient verbalized understanding of the proper use and possible adverse effects of benzoyl peroxide.  All of the patient's questions and concerns were addressed.
Topical Sulfur Applications Pregnancy And Lactation Text: This medication is Pregnancy Category C and has an unknown safety profile during pregnancy. It is unknown if this topical medication is excreted in breast milk.
High Dose Vitamin A Counseling: Side effects reviewed, pt to contact office should one occur.
Isotretinoin Counseling: Patient should get monthly blood tests, not donate blood, not drive at night if vision affected, not share medication, and not undergo elective surgery for 6 months after tx completed. Side effects reviewed, pt to contact office should one occur.
Azelaic Acid Counseling: Patient counseled that medicine may cause skin irritation and to avoid applying near the eyes.  In the event of skin irritation, the patient was advised to reduce the amount of the drug applied or use it less frequently.   The patient verbalized understanding of the proper use and possible adverse effects of azelaic acid.  All of the patient's questions and concerns were addressed.
Birth Control Pills Pregnancy And Lactation Text: This medication should be avoided if pregnant and for the first 30 days post-partum.
Winlevi Counseling:  I discussed with the patient the risks of topical clascoterone including but not limited to erythema, scaling, itching, and stinging. Patient voiced their understanding.
Erythromycin Counseling:  I discussed with the patient the risks of erythromycin including but not limited to GI upset, allergic reaction, drug rash, diarrhea, increase in liver enzymes, and yeast infections.
Aklief counseling:  Patient advised to apply a pea-sized amount only at bedtime and wait 30 minutes after washing their face before applying.  If too drying, patient may add a non-comedogenic moisturizer.  The most commonly reported side effects including irritation, redness, scaling, dryness, stinging, burning, itching, and increased risk of sunburn.  The patient verbalized understanding of the proper use and possible adverse effects of retinoids.  All of the patient's questions and concerns were addressed.
Topical Retinoid Pregnancy And Lactation Text: This medication is Pregnancy Category C. It is unknown if this medication is excreted in breast milk.
Bactrim Pregnancy And Lactation Text: This medication is Pregnancy Category D and is known to cause fetal risk.  It is also excreted in breast milk.
Tazorac Pregnancy And Lactation Text: This medication is not safe during pregnancy. It is unknown if this medication is excreted in breast milk.

## 2023-07-19 NOTE — PROCEDURE: TREATMENT REGIMEN
Detail Level: Zone
Continue Regimen: -Minoxidil topically to scalp daily as needed\\n-Dermasmoothe scalp oil once a day as needed\\n-Finasteride 2mg once a day
Plan: She was using this regimen in past with Dr. Vandana Beauchamp with good results
Initiate Treatment: Doxycycline 50mg orally BID

## 2023-07-19 NOTE — HPI: ACNE (PATIENT REPORTED)
Where Is Your Acne Located?: Face
Additional Comments (Use Complete Sentences): [place] [facility] [place]nPost inflammatory hyperpigmentation\\nTriamcinolone lotion for face, doesn’t work well

## 2023-07-19 NOTE — PROCEDURE: ADDITIONAL NOTES
Render Risk Assessment In Note?: no
Additional Notes: No lesions present today but patient reports inflamed lesions and cyst covering her cheeks and nose - this was evaluated as part of a lupus like eruption but all tests came back negative.\\nI advised patient to return or send photos with any future flares. Will treat with doxycycline now as this may also help with her inflammatory hair loss
Detail Level: Simple

## 2023-09-14 ENCOUNTER — RX ONLY (OUTPATIENT)
Age: 73
Setting detail: RX ONLY
End: 2023-09-14

## 2023-09-14 RX ORDER — FLUOCINONIDE 0.5 MG/ML
SOLUTION TOPICAL
Qty: 60 | Refills: 0 | Status: ERX | COMMUNITY
Start: 2023-09-14

## 2023-09-14 RX ORDER — HYDROCORTISONE 25 MG/G
CREAM TOPICAL
Qty: 20 | Refills: 0 | Status: ERX | COMMUNITY
Start: 2023-09-14

## 2023-09-28 ENCOUNTER — RX ONLY (OUTPATIENT)
Age: 73
Setting detail: RX ONLY
End: 2023-09-28

## 2023-09-28 RX ORDER — CLOBETASOL PROPIONATE 0.5 MG/ML
SOLUTION TOPICAL
Qty: 50 | Refills: 0 | Status: ERX | COMMUNITY
Start: 2023-09-28

## 2023-09-28 RX ORDER — FLUOCINOLONE ACETONIDE, HYDROQUINONE, AND TRETINOIN .1; 40; .5 MG/G; MG/G; MG/G
CREAM TOPICAL
Qty: 30 | Refills: 0 | Status: ERX | COMMUNITY
Start: 2023-09-28

## 2023-10-31 ENCOUNTER — RX ONLY (OUTPATIENT)
Age: 73
Setting detail: RX ONLY
End: 2023-10-31

## 2023-10-31 RX ORDER — HYDROQUINONE 4 %
CREAM (GRAM) TOPICAL
Qty: 30 | Refills: 0 | Status: ERX | COMMUNITY
Start: 2023-10-31

## 2023-11-17 ENCOUNTER — RX ONLY (OUTPATIENT)
Age: 73
Setting detail: RX ONLY
End: 2023-11-17

## 2023-11-17 RX ORDER — FINASTERIDE 5 MG/1
TABLET, FILM COATED ORAL
Qty: 30 | Refills: 1 | Status: ERX | COMMUNITY
Start: 2023-11-17

## 2024-03-15 ENCOUNTER — RX ONLY (OUTPATIENT)
Age: 74
Setting detail: RX ONLY
End: 2024-03-15

## 2024-03-15 RX ORDER — FINASTERIDE 5 MG/1
TABLET, FILM COATED ORAL
Qty: 30 | Refills: 0 | Status: ERX